# Patient Record
Sex: MALE | Race: OTHER
[De-identification: names, ages, dates, MRNs, and addresses within clinical notes are randomized per-mention and may not be internally consistent; named-entity substitution may affect disease eponyms.]

---

## 2020-10-04 ENCOUNTER — HOSPITAL ENCOUNTER (EMERGENCY)
Dept: HOSPITAL 8 - ED | Age: 30
Discharge: HOME | End: 2020-10-04
Payer: COMMERCIAL

## 2020-10-04 VITALS — HEIGHT: 65 IN | WEIGHT: 167.77 LBS | BODY MASS INDEX: 27.95 KG/M2

## 2020-10-04 VITALS — DIASTOLIC BLOOD PRESSURE: 58 MMHG | SYSTOLIC BLOOD PRESSURE: 117 MMHG

## 2020-10-04 DIAGNOSIS — R11.2: ICD-10-CM

## 2020-10-04 DIAGNOSIS — N50.811: Primary | ICD-10-CM

## 2020-10-04 DIAGNOSIS — Z87.891: ICD-10-CM

## 2020-10-04 PROCEDURE — 99285 EMERGENCY DEPT VISIT HI MDM: CPT

## 2020-10-04 PROCEDURE — 76870 US EXAM SCROTUM: CPT

## 2020-10-04 PROCEDURE — 96374 THER/PROPH/DIAG INJ IV PUSH: CPT

## 2020-10-04 PROCEDURE — 74177 CT ABD & PELVIS W/CONTRAST: CPT

## 2023-10-02 NOTE — NUR
Rx Refill Note  Requested Prescriptions     Pending Prescriptions Disp Refills    prednisoLONE acetate (PRED FORTE) 1 % ophthalmic suspension [Pharmacy Med Name: PREDNISOLONE ACETATE 1% SUSP] 10 mL 2     Sig: INSTILL TWO DROPS INTO THE LEFT EYE FOUR TIMES A DAY      Last office visit with prescribing clinician: 8/8/2023         Daphne Heard MA  10/02/23, 13:57 CDT   ct wnl. in custody. vss. recheck. as